# Patient Record
Sex: FEMALE | Race: WHITE | Employment: FULL TIME | URBAN - METROPOLITAN AREA
[De-identification: names, ages, dates, MRNs, and addresses within clinical notes are randomized per-mention and may not be internally consistent; named-entity substitution may affect disease eponyms.]

---

## 2017-01-27 ENCOUNTER — HOSPITAL ENCOUNTER (OUTPATIENT)
Dept: OCCUPATIONAL MEDICINE | Age: 52
Discharge: HOME | End: 2017-01-27

## 2017-01-27 DIAGNOSIS — T14.8XXA JOINT INJURY: ICD-10-CM

## 2017-02-13 ENCOUNTER — HOSPITAL ENCOUNTER (OUTPATIENT)
Dept: MAMMOGRAPHY | Age: 52
Discharge: HOME | End: 2017-02-13
Attending: NURSE PRACTITIONER
Payer: COMMERCIAL

## 2017-02-13 DIAGNOSIS — Z12.39 SCREENING FOR BREAST CANCER: ICD-10-CM

## 2017-02-13 PROCEDURE — 77067 SCR MAMMO BI INCL CAD: CPT

## 2017-06-08 ENCOUNTER — HOSPITAL ENCOUNTER (OUTPATIENT)
Dept: ULTRASOUND IMAGING | Age: 52
Discharge: HOME | End: 2017-06-08
Attending: NURSE PRACTITIONER
Payer: COMMERCIAL

## 2017-06-08 DIAGNOSIS — R10.30 LOWER ABDOMINAL PAIN: ICD-10-CM

## 2017-06-08 PROCEDURE — 76830 TRANSVAGINAL US NON-OB: CPT

## 2017-06-09 PROBLEM — R10.2 PELVIC PAIN IN FEMALE: Status: ACTIVE | Noted: 2017-06-09

## 2017-06-09 PROBLEM — N83.209 HEMORRHAGIC OVARIAN CYST: Status: ACTIVE | Noted: 2017-06-09

## 2017-06-14 ENCOUNTER — HOSPITAL ENCOUNTER (OUTPATIENT)
Dept: CT IMAGING | Age: 52
Discharge: HOME | End: 2017-06-14
Attending: NURSE PRACTITIONER
Payer: COMMERCIAL

## 2017-06-14 DIAGNOSIS — R10.30 LOWER ABDOMINAL PAIN: ICD-10-CM

## 2017-06-14 PROCEDURE — 74011636320 HC RX REV CODE- 636/320

## 2017-06-14 PROCEDURE — 74177 CT ABD & PELVIS W/CONTRAST: CPT

## 2017-06-14 PROCEDURE — 74011000258 HC RX REV CODE- 258

## 2017-06-14 RX ORDER — SODIUM CHLORIDE 0.9 % (FLUSH) 0.9 %
10 SYRINGE (ML) INJECTION
Status: COMPLETED | OUTPATIENT
Start: 2017-06-14 | End: 2017-06-14

## 2017-06-14 RX ADMIN — Medication 10 ML: at 15:26

## 2017-06-14 RX ADMIN — DIATRIZOATE MEGLUMINE AND DIATRIZOATE SODIUM 15 ML: 660; 100 LIQUID ORAL; RECTAL at 15:26

## 2017-06-14 RX ADMIN — IOPAMIDOL 100 ML: 755 INJECTION, SOLUTION INTRAVENOUS at 15:26

## 2017-06-14 RX ADMIN — SODIUM CHLORIDE 100 ML: 900 INJECTION, SOLUTION INTRAVENOUS at 15:26

## 2018-06-07 ENCOUNTER — HOSPITAL ENCOUNTER (OUTPATIENT)
Dept: MAMMOGRAPHY | Age: 53
Discharge: HOME | End: 2018-06-07
Attending: NURSE PRACTITIONER
Payer: COMMERCIAL

## 2018-06-07 DIAGNOSIS — Z12.39 BREAST CANCER SCREENING: ICD-10-CM

## 2018-06-07 PROCEDURE — 77067 SCR MAMMO BI INCL CAD: CPT

## 2019-07-25 ENCOUNTER — HOSPITAL ENCOUNTER (OUTPATIENT)
Dept: MAMMOGRAPHY | Age: 54
Discharge: HOME | End: 2019-07-25
Attending: NURSE PRACTITIONER
Payer: COMMERCIAL

## 2019-07-25 DIAGNOSIS — Z12.31 VISIT FOR SCREENING MAMMOGRAM: ICD-10-CM

## 2019-07-25 PROCEDURE — 77067 SCR MAMMO BI INCL CAD: CPT

## 2020-08-12 ENCOUNTER — HOSPITAL ENCOUNTER (EMERGENCY)
Age: 55
Discharge: HOME | End: 2020-08-12
Attending: EMERGENCY MEDICINE
Payer: COMMERCIAL

## 2020-08-12 ENCOUNTER — APPOINTMENT (OUTPATIENT)
Dept: GENERAL RADIOLOGY | Age: 55
End: 2020-08-12
Attending: EMERGENCY MEDICINE
Payer: COMMERCIAL

## 2020-08-12 VITALS
RESPIRATION RATE: 18 BRPM | OXYGEN SATURATION: 98 % | TEMPERATURE: 98.2 F | WEIGHT: 170 LBS | HEART RATE: 79 BPM | BODY MASS INDEX: 25.18 KG/M2 | HEIGHT: 69 IN | SYSTOLIC BLOOD PRESSURE: 135 MMHG | DIASTOLIC BLOOD PRESSURE: 76 MMHG

## 2020-08-12 DIAGNOSIS — R07.89 ATYPICAL CHEST PAIN: Primary | ICD-10-CM

## 2020-08-12 DIAGNOSIS — R49.0 HOARSENESS: ICD-10-CM

## 2020-08-12 DIAGNOSIS — R06.02 SOB (SHORTNESS OF BREATH): ICD-10-CM

## 2020-08-12 LAB
ALBUMIN SERPL-MCNC: 4.4 G/DL (ref 3.5–5)
ALBUMIN/GLOB SERPL: 1.3 {RATIO} (ref 1.2–3.5)
ALP SERPL-CCNC: 132 U/L (ref 50–136)
ALT SERPL-CCNC: 13 U/L (ref 12–65)
ANION GAP SERPL CALC-SCNC: 6 MMOL/L (ref 7–16)
AST SERPL-CCNC: 9 U/L (ref 15–37)
BACTERIA URNS QL MICRO: 0 /HPF
BASOPHILS # BLD: 0 K/UL (ref 0–0.2)
BASOPHILS NFR BLD: 1 % (ref 0–2)
BILIRUB SERPL-MCNC: 0.7 MG/DL (ref 0.2–1.1)
BUN SERPL-MCNC: 14 MG/DL (ref 6–23)
CALCIUM SERPL-MCNC: 9.6 MG/DL (ref 8.3–10.4)
CASTS URNS QL MICRO: NORMAL /LPF
CHLORIDE SERPL-SCNC: 107 MMOL/L (ref 98–107)
CO2 SERPL-SCNC: 28 MMOL/L (ref 21–32)
CREAT SERPL-MCNC: 0.87 MG/DL (ref 0.6–1)
DIFFERENTIAL METHOD BLD: ABNORMAL
EOSINOPHIL # BLD: 0 K/UL (ref 0–0.8)
EOSINOPHIL NFR BLD: 0 % (ref 0.5–7.8)
EPI CELLS #/AREA URNS HPF: NORMAL /HPF
ERYTHROCYTE [DISTWIDTH] IN BLOOD BY AUTOMATED COUNT: 12 % (ref 11.9–14.6)
GLOBULIN SER CALC-MCNC: 3.3 G/DL (ref 2.3–3.5)
GLUCOSE SERPL-MCNC: 110 MG/DL (ref 65–100)
HCT VFR BLD AUTO: 45.8 % (ref 35.8–46.3)
HGB BLD-MCNC: 15.5 G/DL (ref 11.7–15.4)
IMM GRANULOCYTES # BLD AUTO: 0 K/UL (ref 0–0.5)
IMM GRANULOCYTES NFR BLD AUTO: 0 % (ref 0–5)
LYMPHOCYTES # BLD: 1.3 K/UL (ref 0.5–4.6)
LYMPHOCYTES NFR BLD: 21 % (ref 13–44)
MCH RBC QN AUTO: 29.7 PG (ref 26.1–32.9)
MCHC RBC AUTO-ENTMCNC: 33.8 G/DL (ref 31.4–35)
MCV RBC AUTO: 87.7 FL (ref 79.6–97.8)
MONOCYTES # BLD: 0.3 K/UL (ref 0.1–1.3)
MONOCYTES NFR BLD: 4 % (ref 4–12)
NEUTS SEG # BLD: 4.6 K/UL (ref 1.7–8.2)
NEUTS SEG NFR BLD: 74 % (ref 43–78)
NRBC # BLD: 0 K/UL (ref 0–0.2)
PLATELET # BLD AUTO: 284 K/UL (ref 150–450)
PMV BLD AUTO: 9.7 FL (ref 9.4–12.3)
POTASSIUM SERPL-SCNC: 3.8 MMOL/L (ref 3.5–5.1)
PROT SERPL-MCNC: 7.7 G/DL (ref 6.3–8.2)
RBC # BLD AUTO: 5.22 M/UL (ref 4.05–5.2)
RBC #/AREA URNS HPF: 0 /HPF
SODIUM SERPL-SCNC: 141 MMOL/L (ref 136–145)
TROPONIN-HIGH SENSITIVITY: 5.5 PG/ML (ref 0–14)
WBC # BLD AUTO: 6.3 K/UL (ref 4.3–11.1)
WBC URNS QL MICRO: NORMAL /HPF

## 2020-08-12 PROCEDURE — 93005 ELECTROCARDIOGRAM TRACING: CPT | Performed by: EMERGENCY MEDICINE

## 2020-08-12 PROCEDURE — 84484 ASSAY OF TROPONIN QUANT: CPT

## 2020-08-12 PROCEDURE — 80053 COMPREHEN METABOLIC PANEL: CPT

## 2020-08-12 PROCEDURE — 81015 MICROSCOPIC EXAM OF URINE: CPT

## 2020-08-12 PROCEDURE — 85025 COMPLETE CBC W/AUTO DIFF WBC: CPT

## 2020-08-12 PROCEDURE — 99284 EMERGENCY DEPT VISIT MOD MDM: CPT

## 2020-08-12 PROCEDURE — 71045 X-RAY EXAM CHEST 1 VIEW: CPT

## 2020-08-12 PROCEDURE — 81003 URINALYSIS AUTO W/O SCOPE: CPT

## 2020-08-12 NOTE — ED PROVIDER NOTES
81-year-old female presents with chest pain onset this morning. It is left parasternal in the costochondral region. It is sharp in nature and fleeting in duration comes and goes for just a few seconds at a time but recurs very frequently. There is no radiation to neck, jaw, shoulder, arm. There is no nausea no diaphoresis. Patient has no cardiac history. Patient has no history of DVT or PE, no risk factors for DVT or PE. She has felt herself a little short winded today intermittently although not specifically with the chest pains. She describes it as a sensation as though she cannot take a full breath, and sometimes has to make herself try to take a deep breath. She feels a little more winded with household activities. Patient has noticed a raspiness to her voice for a couple days. Saturday evening August 15 patient was seated on the sofa around 6 or 7 PM and felt something close off her windpipe for a few seconds she was unable to breathe and then shortly thereafter was able to start taking a breath again. She is not sure if she had just had dinner beforehand or not but did not experience any water brash with this. The next day on Nathan, August 15 when she was laying down to go to bed she noticed a similar throat closing experience which was not as total, but lasted for perhaps 5 minutes she was able to breathe through it,    Patient seen by her PCP Monday, August 10 and a COVID test was returned as negative. She has a 8-month history of a feeling of \"a rock\" in her throat which does not cause difficulty swallowing but she just feels is there. She is been taking apple cider vinegar Gummies at the direction of her allergist in case this is being caused by \"low-level reflux\". This is been for 8 months and she was thinking of seeing a GI doctor. She has not been on any prolonged trials of PPIs or H2 blockers. She rarely finds herself needing to use an antacid.            Past Medical History: Diagnosis Date    Back pain     Hypertension     Menopause     Migraine        Past Surgical History:   Procedure Laterality Date    HX ANKLE FRACTURE TX      2 screws    HX OTHER SURGICAL      left fibula - with plate and 8 screws         Family History:   Problem Relation Age of Onset    Heart Disease Father 66    Breast Cancer Neg Hx        Social History     Socioeconomic History    Marital status:      Spouse name: Not on file    Number of children: Not on file    Years of education: Not on file    Highest education level: Not on file   Occupational History    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   Social Needs    Financial resource strain: Not on file    Food insecurity     Worry: Not on file     Inability: Not on file   [employer] needs     Medical: Not on file     Non-medical: Not on file   Tobacco Use    Smoking status: Never Smoker    Smokeless tobacco: Never Used   Substance and Sexual Activity    Alcohol use: No     Alcohol/week: 0.0 standard drinks    Drug use: No    Sexual activity: Yes     Partners: Male     Birth control/protection: Surgical   Lifestyle    Physical activity     Days per week: Not on file     Minutes per session: Not on file    Stress: Not on file   Relationships    Social connections     Talks on phone: Not on file     Gets together: Not on file     Attends [employer] service: Not on file     Active member of club or organization: Not on file     Attends meetings of clubs or organizations: Not on file     Relationship status: Not on file    Intimate partner violence     Fear of current or ex partner: Not on file     Emotionally abused: Not on file     Physically abused: Not on file     Forced sexual activity: Not on file   Other Topics Concern     Service Not Asked    Blood Transfusions Not Asked    Caffeine Concern No    Occupational Exposure Not Asked   Oleksandr Croft Hazards Not Asked    Sleep Concern Not Asked    Stress Concern Not Asked    Weight Concern Not Asked    Special Diet Not Asked    Back Care Not Asked    Exercise No    Bike Helmet Not Asked    Seat Belt Yes    Self-Exams Yes   Social History Narrative    Lives with  Maircruz Singh  since 1986. Children    Roberto Carlos Singer 7/26/93    Eloina Diaz 3/24/96        Abuse: Feels safe at home, no history of physical abuse, no history of sexual abuse         ALLERGIES: Lactase    Review of Systems   Constitutional: Negative for chills and fever. HENT: Positive for congestion and voice change. Negative for postnasal drip, rhinorrhea, sore throat and trouble swallowing. Eyes: Negative for discharge and redness. Respiratory: Positive for shortness of breath. Negative for cough. Cardiovascular: Positive for chest pain. Negative for palpitations and leg swelling. Gastrointestinal: Negative for abdominal pain, diarrhea, nausea and vomiting. Genitourinary: Negative for difficulty urinating and dysuria. Musculoskeletal: Negative for arthralgias, back pain, myalgias, neck pain and neck stiffness. Skin: Negative for rash and wound. Tick bite to her right shoulder in June estimates it was on it for no more than an hour, patient's had no fevers or joint aches since, no rash associated with this. Neurological: Negative for dizziness, speech difficulty, weakness, light-headedness and headaches. All other systems reviewed and are negative. Vitals:    08/12/20 1711   BP: 161/64   Pulse: 90   Resp: 18   Temp: 98.1 °F (36.7 °C)   SpO2: 99%   Weight: 77.1 kg (170 lb)   Height: 5' 9\" (1.753 m)            Physical Exam  Vitals signs and nursing note reviewed. Constitutional:       General: She is not in acute distress. Appearance: Normal appearance. She is well-developed. She is not ill-appearing, toxic-appearing or diaphoretic. HENT:      Head: Normocephalic and atraumatic. Eyes:      General: No scleral icterus. Right eye: No discharge. Left eye: No discharge. Conjunctiva/sclera: Conjunctivae normal.      Pupils: Pupils are equal, round, and reactive to light. Neck:      Musculoskeletal: Normal range of motion and neck supple. Cardiovascular:      Rate and Rhythm: Normal rate and regular rhythm. Heart sounds: Normal heart sounds. No murmur. No gallop. Pulmonary:      Effort: Pulmonary effort is normal. No respiratory distress. Breath sounds: Normal breath sounds. No wheezing or rales. Chest:      Chest wall: Tenderness present. No swelling or crepitus. Abdominal:      General: Bowel sounds are normal.      Palpations: Abdomen is soft. Tenderness: There is no abdominal tenderness. There is no guarding. Musculoskeletal: Normal range of motion. Skin:     General: Skin is warm and dry. Findings: No rash. Neurological:      General: No focal deficit present. Mental Status: She is alert and oriented to person, place, and time. Mental status is at baseline. Motor: No abnormal muscle tone. Comments: cni 2-12 grossly  Nl gait,  Nl speech     Psychiatric:         Mood and Affect: Mood normal.         Behavior: Behavior normal.          MDM  Number of Diagnoses or Management Options  Atypical chest pain:   Hoarseness:   SOB (shortness of breath):   Diagnosis management comments: Medical decision making note:  Atypical chest pain EKG shows no acute coronary syndrome, enzymes are negative, chest pain seems reproducible and is likely musculoskeletal.  Recommend pcp f/u, consider 8 week trial of ppi before GI f/u  This concludes the \"medical decision making note\" part of this emergency department visit note.            Procedures

## 2020-08-13 LAB
ATRIAL RATE: 77 BPM
CALCULATED P AXIS, ECG09: 94 DEGREES
CALCULATED R AXIS, ECG10: 86 DEGREES
CALCULATED T AXIS, ECG11: 80 DEGREES
DIAGNOSIS, 93000: NORMAL
P-R INTERVAL, ECG05: 154 MS
Q-T INTERVAL, ECG07: 356 MS
QRS DURATION, ECG06: 94 MS
QTC CALCULATION (BEZET), ECG08: 402 MS
VENTRICULAR RATE, ECG03: 77 BPM

## 2020-08-13 NOTE — ED NOTES
I have reviewed discharge instructions with the patient. The patient verbalized understanding. Patient left ED via Discharge Method: ambulatory to Home with self. Opportunity for questions and clarification provided. Patient given 0 scripts. To continue your aftercare when you leave the hospital, you may receive an automated call from our care team to check in on how you are doing. This is a free service and part of our promise to provide the best care and service to meet your aftercare needs.  If you have questions, or wish to unsubscribe from this service please call [phone]. Thank you for Choosing our [address] [place] [address] Emergency Department.

## 2020-08-13 NOTE — DISCHARGE INSTRUCTIONS
Take 3-4 advil every 8 hours for 2 days  Take a prilosec otc20, every day  Follow up with your doctor at [address] a finger tip pulse ox probe as discussed to gauge shortness of breath -   high 90's is good  low 90's concerning  Anything less than 90% is dangerous    Patient Education     Patient Education        Musculoskeletal Chest Pain: Care Instructions  Your Care Instructions     Chest pain is not always a sign that something is wrong with your heart or that you have another serious problem. The doctor thinks your chest pain is caused by strained muscles or ligaments, inflamed chest cartilage, or another problem in your chest, rather than by your heart. You may need more tests to find the cause of your chest pain. Follow-up care is a key part of your treatment and safety. Be sure to make and go to all appointments, and call your doctor if you are having problems. It's also a good idea to know your test results and keep a list of the medicines you take. How can you care for yourself at home? · Take pain medicines exactly as directed. ? If the doctor gave you a prescription medicine for pain, take it as prescribed. ? If you are not taking a prescription pain medicine, ask your doctor if you can take an over-the-counter medicine. · Rest and protect the sore area. · Stop, change, or take a break from any activity that may be causing your pain or soreness. · Put ice or a cold pack on the sore area for 10 to 20 minutes at a time. Try to do this every 1 to 2 hours for the next 3 days (when you are awake) or until the swelling goes down. Put a thin cloth between the ice and your skin. · After 2 or 3 days, apply a heating pad set on low or a warm cloth to the area that hurts. Some doctors suggest that you go back and forth between hot and cold. · Do not wrap or tape your ribs for support. This may cause you to take smaller breaths, which could increase your risk of lung problems.   · Mentholated creams such as Cara Bob or [facility] may soothe sore muscles. Follow the instructions on the package. · Follow your doctor's instructions for exercising. · Gentle stretching and massage may help you get better faster. Stretch slowly to the point just before pain begins, and hold the stretch for at least 15 to 30 seconds. Do this 3 or 4 times a day. Stretch just after you have applied heat. · As your pain gets better, slowly return to your normal activities. Any increased pain may be a sign that you need to rest a while longer. When should you call for help? [id number] anytime you think you may need emergency care. For example, call if:  · You have chest pain or pressure. This may occur with:  ? Sweating. ? Shortness of breath. ? Nausea or vomiting. ? Pain that spreads from the chest to the neck, jaw, or one or both shoulders or arms. ? Dizziness or lightheadedness. ? A fast or uneven pulse. After calling 911, chew 1 adult-strength aspirin. Wait for an ambulance. Do not try to drive yourself. · You have sudden chest pain and shortness of breath, or you cough up blood. Call your doctor now or seek immediate medical care if:  · You have any trouble breathing. · Your chest pain gets worse. · Your chest pain occurs consistently with exercise and is relieved by rest.  Watch closely for changes in your health, and be sure to contact your doctor if:  · Your chest pain does not get better after 1 week. Where can you learn more? Go to http://jenifer-darby.info/  Enter V293 in the search box to learn more about \"Musculoskeletal Chest Pain: Care Instructions. \"  Current as of: June 26, 2019               Content Version: 12.5  © 7199-4725 [employer]. Care instructions adapted under license by [employer] (which disclaims liability or warranty for this information).  If you have questions about a medical condition or this instruction, always ask your healthcare professional. Atif Pate, [facility] disclaims any warranty or liability for your use of this information. Patient Education        Shortness of Breath: Care Instructions  Your Care Instructions  Shortness of breath has many causes. Sometimes conditions such as anxiety can lead to shortness of breath. Some people get mild shortness of breath when they exercise. Trouble breathing also can be a symptom of a serious problem, such as asthma, lung disease, emphysema, heart problems, and pneumonia. If your shortness of breath continues, you may need tests and treatment. Watch for any changes in your breathing and other symptoms. Follow-up care is a key part of your treatment and safety. Be sure to make and go to all appointments, and call your doctor if you are having problems. It's also a good idea to know your test results and keep a list of the medicines you take. How can you care for yourself at home? · Do not smoke or allow others to smoke around you. If you need help quitting, talk to your doctor about stop-smoking programs and medicines. These can increase your chances of quitting for good. · Get plenty of rest and sleep. · Take your medicines exactly as prescribed. Call your doctor if you think you are having a problem with your medicine. · Find healthy ways to deal with stress. ? Exercise daily. ? Get plenty of sleep. ? Eat regularly and well. When should you call for help? [id number] anytime you think you may need emergency care. For example, call if:  · You have severe shortness of breath. · You have symptoms of a heart attack. These may include:  ? Chest pain or pressure, or a strange feeling in the chest.  ? Sweating. ? Shortness of breath. ? Nausea or vomiting. ? Pain, pressure, or a strange feeling in the back, neck, jaw, or upper belly or in one or both shoulders or arms. ? Lightheadedness or sudden weakness. ? A fast or irregular heartbeat.   After you call 911, the  may tell you to chew 1 adult-strength or 2 to 4 low-dose aspirin. Wait for an ambulance. Do not try to drive yourself. Call your doctor now or seek immediate medical care if:  · Your shortness of breath gets worse or you start to wheeze. Wheezing is a high-pitched sound when you breathe. · You wake up at night out of breath or have to prop your head up on several pillows to breathe. · You are short of breath after only light activity or while at rest.  Watch closely for changes in your health, and be sure to contact your doctor if:  · You do not get better over the next 1 to 2 days. Where can you learn more? Go to http://jenifer-darby.info/  Enter S780 in the search box to learn more about \"Shortness of Breath: Care Instructions. \"  Current as of: February 24, 2020               Content Version: 12.5  © 1170-5495 [email or web address]. Care instructions adapted under license by [employer] (which disclaims liability or warranty for this information). If you have questions about a medical condition or this instruction, always ask your healthcare professional. [address] any warranty or liability for your use of this information. Costochondritis: Care Instructions  Your Care Instructions  You have chest pain because the cartilage of your rib cage is inflamed. This problem is called costochondritis. This type of chest wall pain may last from days to weeks. It is not a heart problem. Sometimes costochondritis occurs with a cold or the flu, and other times the exact cause is not known. Follow-up care is a key part of your treatment and safety. Be sure to make and go to all appointments, and call your doctor if you are having problems. It's also a good idea to know your test results and keep a list of the medicines you take. How can you care for yourself at home? · Take medicines for pain and inflammation exactly as directed.   ? If the doctor gave you a prescription medicine, take it as prescribed. ? If you are not taking a prescription pain medicine, ask your doctor if you can take an over-the-counter medicine. ? Do not take two or more pain medicines at the same time unless the doctor told you to. Many pain medicines have acetaminophen, which is Tylenol. Too much acetaminophen (Tylenol) can be harmful. · It may help to use a warm compress or heating pad (set on low) on your chest. You can also try alternating heat and ice. Put ice or a cold pack on the area for 10 to 20 minutes at a time. Put a thin cloth between the ice and your skin. · Avoid any activity that strains the chest area. As your pain gets better, you can slowly return to your normal activities. · Do not use tape, an elastic bandage, a \"rib belt,\" or anything else that restricts your chest wall motion. When should you call for help? [id number] anytime you think you may need emergency care. For example, call if:  · You have new or different chest pain or pressure. This may occur with:  ? Sweating. ? Shortness of breath. ? Nausea or vomiting. ? Pain that spreads from the chest to the neck, jaw, or one or both shoulders or arms. ? Dizziness or lightheadedness. ? A fast or uneven pulse. After calling 911, chew 1 adult-strength aspirin. Wait for an ambulance. Do not try to drive yourself. · You have severe trouble breathing. Call your doctor now or seek immediate medical care if:  · You have a fever or cough. · You have any trouble breathing. · Your chest pain gets worse. Watch closely for changes in your health, and be sure to contact your doctor if:  · Your chest pain continues even though you are taking anti-inflammatory medicine. · Your chest wall pain has not improved after 5 to 7 days. Where can you learn more? Go to http://jenifer-darby.info/  Enter [id number] in the search box to learn more about \"Costochondritis: Care Instructions. \"  Current as of: June 26, 9679               LLBGOWX Version: 12.5  © 2386-3234 [employer]. Care instructions adapted under license by [employer] (which disclaims liability or warranty for this information). If you have questions about a medical condition or this instruction, always ask your healthcare professional. [address] any warranty or liability for your use of this information.

## 2021-01-29 ENCOUNTER — APPOINTMENT (RX ONLY)
Dept: URBAN - METROPOLITAN AREA CLINIC 349 | Facility: CLINIC | Age: 56
Setting detail: DERMATOLOGY
End: 2021-01-29

## 2021-01-29 DIAGNOSIS — L72.8 OTHER FOLLICULAR CYSTS OF THE SKIN AND SUBCUTANEOUS TISSUE: ICD-10-CM

## 2021-01-29 DIAGNOSIS — Z12.83 ENCOUNTER FOR SCREENING FOR MALIGNANT NEOPLASM OF SKIN: ICD-10-CM

## 2021-01-29 DIAGNOSIS — D485 NEOPLASM OF UNCERTAIN BEHAVIOR OF SKIN: ICD-10-CM

## 2021-01-29 DIAGNOSIS — D22 MELANOCYTIC NEVI: ICD-10-CM

## 2021-01-29 DIAGNOSIS — L85.3 XEROSIS CUTIS: ICD-10-CM

## 2021-01-29 DIAGNOSIS — D18.0 HEMANGIOMA: ICD-10-CM

## 2021-01-29 PROBLEM — D48.5 NEOPLASM OF UNCERTAIN BEHAVIOR OF SKIN: Status: ACTIVE | Noted: 2021-01-29

## 2021-01-29 PROBLEM — D18.01 HEMANGIOMA OF SKIN AND SUBCUTANEOUS TISSUE: Status: ACTIVE | Noted: 2021-01-29

## 2021-01-29 PROBLEM — D22.9 MELANOCYTIC NEVI, UNSPECIFIED: Status: ACTIVE | Noted: 2021-01-29

## 2021-01-29 PROCEDURE — 88304 TISSUE EXAM BY PATHOLOGIST: CPT

## 2021-01-29 PROCEDURE — ? COUNSELING

## 2021-01-29 PROCEDURE — 11102 TANGNTL BX SKIN SINGLE LES: CPT

## 2021-01-29 PROCEDURE — ? PATHOLOGY BILLING

## 2021-01-29 PROCEDURE — ? BIOPSY BY SHAVE METHOD

## 2021-01-29 PROCEDURE — 99242 OFF/OP CONSLTJ NEW/EST SF 20: CPT | Mod: 25

## 2021-01-29 PROCEDURE — A4550 SURGICAL TRAYS: HCPCS

## 2021-01-29 ASSESSMENT — LOCATION SIMPLE DESCRIPTION DERM
LOCATION SIMPLE: LEFT CHEEK
LOCATION SIMPLE: LEFT CHEEK
LOCATION SIMPLE: LEFT ANTERIOR NECK
LOCATION SIMPLE: ABDOMEN

## 2021-01-29 ASSESSMENT — LOCATION ZONE DERM
LOCATION ZONE: NECK
LOCATION ZONE: TRUNK
LOCATION ZONE: FACE
LOCATION ZONE: FACE

## 2021-01-29 ASSESSMENT — LOCATION DETAILED DESCRIPTION DERM
LOCATION DETAILED: LEFT LATERAL ABDOMEN
LOCATION DETAILED: LEFT INFERIOR LATERAL NECK
LOCATION DETAILED: LEFT CENTRAL MALAR CHEEK
LOCATION DETAILED: LEFT CENTRAL MALAR CHEEK

## 2021-01-29 NOTE — HPI: SKIN LESION
How Severe Is Your Skin Lesion?: moderate
Is This A New Presentation, Or A Follow-Up?: Skin Lesion
Which Family Member (Optional)?: Grandmother

## 2021-01-29 NOTE — PROCEDURE: BIOPSY BY SHAVE METHOD
Hide Second Anesthesia?: No
Electrodesiccation Text: The wound bed was treated with electrodesiccation after the biopsy was performed.
Depth Of Biopsy: dermis
Biopsy Type: H and E
Size Of Lesion In Cm: 0
Billing Type: Third-Party Bill
Anesthesia Type: 2% lidocaine with epinephrine
Type Of Destruction Used: Curettage
Notification Instructions: Patient will be notified of biopsy results. However, patient instructed to call the office if not contacted within 2 weeks.
Silver Nitrate Text: The wound bed was treated with silver nitrate after the biopsy was performed.
Accession #: dr [email or web address] read
Hemostasis: Aluminum Chloride
Anesthesia Volume In Cc (Will Not Render If 0): 0.5
Cryotherapy Text: The wound bed was treated with cryotherapy after the biopsy was performed.
Dressing: bandage
Consent: Written consent was obtained and risks were reviewed including but not limited to scarring, infection, bleeding, scabbing, incomplete removal, nerve damage and allergy to anesthesia.
Detail Level: Detailed
Was A Bandage Applied: Yes
Wound Care: Vaseline
Biopsy Method: Dermablade
Post-Care Instructions: I reviewed with the patient in detail post-care instructions. Patient is to keep the biopsy site dry overnight, and then apply bacitracin twice daily until healed. Patient may apply hydrogen peroxide soaks to remove any crusting.  After the procedure, the patient was observed for 5-10 minutes and was oriented to person, place and time and demied feeling dizzy, queasy, and stated that they did not feel that they were going to faint.
Electrodesiccation And Curettage Text: The wound bed was treated with electrodesiccation and curettage after the biopsy was performed.
Information: Selecting Yes will display possible errors in your note based on the variables you have selected. This validation is only offered as a suggestion for you. PLEASE NOTE THAT THE VALIDATION TEXT WILL BE REMOVED WHEN YOU FINALIZE YOUR NOTE. IF YOU WANT TO FAX A PRELIMINARY NOTE YOU WILL NEED TO TOGGLE THIS TO 'NO' IF YOU DO NOT WANT IT IN YOUR FAXED NOTE.

## 2021-01-29 NOTE — PROCEDURE: PATHOLOGY BILLING
Immunohistochemistry ([id number] and [id number]) billing is not performed here. Please use the Immunohistochemistry Stain Billing plan to accomplish this. Immunohistochemistry (51177 and 16499) billing is not performed here. Please use the Immunohistochemistry Stain Billing plan to accomplish this.

## 2021-07-29 ENCOUNTER — HOSPITAL ENCOUNTER (OUTPATIENT)
Dept: LAB | Age: 56
Discharge: HOME | End: 2021-07-29

## 2021-07-29 PROCEDURE — 88305 TISSUE EXAM BY PATHOLOGIST: CPT

## 2022-03-20 PROBLEM — R10.2 PELVIC PAIN IN FEMALE: Status: ACTIVE | Noted: 2017-06-09

## 2022-03-20 PROBLEM — N83.209 HEMORRHAGIC OVARIAN CYST: Status: ACTIVE | Noted: 2017-06-09

## 2023-03-27 ENCOUNTER — HOSPITAL ENCOUNTER (OUTPATIENT)
Dept: MAMMOGRAPHY | Age: 58
Discharge: HOME | End: 2023-03-30
Payer: COMMERCIAL

## 2023-03-27 DIAGNOSIS — Z12.31 VISIT FOR SCREENING MAMMOGRAM: ICD-10-CM

## 2023-03-27 PROCEDURE — 77063 BREAST TOMOSYNTHESIS BI: CPT

## 2024-02-28 ENCOUNTER — TRANSCRIBE ORDERS (OUTPATIENT)
Dept: SCHEDULING | Age: 59
End: 2024-02-28

## 2024-02-28 DIAGNOSIS — Z12.31 VISIT FOR SCREENING MAMMOGRAM: Primary | ICD-10-CM

## 2024-03-28 ENCOUNTER — HOSPITAL ENCOUNTER (OUTPATIENT)
Dept: MAMMOGRAPHY | Age: 59
Discharge: HOME | End: 2024-03-28
Payer: COMMERCIAL

## 2024-03-28 VITALS — WEIGHT: 173 LBS | HEIGHT: 69 IN | BODY MASS INDEX: 25.62 KG/M2

## 2024-03-28 DIAGNOSIS — Z12.31 VISIT FOR SCREENING MAMMOGRAM: ICD-10-CM

## 2024-03-28 PROCEDURE — 77063 BREAST TOMOSYNTHESIS BI: CPT

## 2025-02-28 ENCOUNTER — TRANSCRIBE ORDERS (OUTPATIENT)
Dept: SCHEDULING | Age: 60
End: 2025-02-28

## 2025-02-28 DIAGNOSIS — Z12.31 OTHER SCREENING MAMMOGRAM: Primary | ICD-10-CM

## 2025-04-02 ENCOUNTER — HOSPITAL ENCOUNTER (OUTPATIENT)
Dept: MAMMOGRAPHY | Age: 60
Discharge: HOME | End: 2025-04-05
Payer: COMMERCIAL

## 2025-04-02 DIAGNOSIS — Z12.31 OTHER SCREENING MAMMOGRAM: ICD-10-CM

## 2025-04-02 PROCEDURE — 77063 BREAST TOMOSYNTHESIS BI: CPT

## 2025-06-23 ENCOUNTER — HOSPITAL ENCOUNTER (EMERGENCY)
Age: 60
Discharge: HOME | End: 2025-06-23
Attending: EMERGENCY MEDICINE
Payer: COMMERCIAL

## 2025-06-23 VITALS
BODY MASS INDEX: 22.22 KG/M2 | HEART RATE: 88 BPM | RESPIRATION RATE: 16 BRPM | TEMPERATURE: 98.4 F | OXYGEN SATURATION: 99 % | HEIGHT: 69 IN | WEIGHT: 150 LBS | DIASTOLIC BLOOD PRESSURE: 91 MMHG | SYSTOLIC BLOOD PRESSURE: 134 MMHG

## 2025-06-23 DIAGNOSIS — I47.19 ATRIAL TACHYCARDIA: Primary | ICD-10-CM

## 2025-06-23 LAB
ALBUMIN SERPL-MCNC: 3.5 G/DL (ref 3.5–5)
ALBUMIN/GLOB SERPL: 1.5 (ref 1–1.9)
ALP SERPL-CCNC: 110 U/L (ref 35–104)
ALT SERPL-CCNC: 10 U/L (ref 8–45)
ANION GAP SERPL CALC-SCNC: 12 MMOL/L (ref 7–16)
AST SERPL-CCNC: 18 U/L (ref 15–37)
BASOPHILS # BLD: 0.03 K/UL (ref 0–0.2)
BASOPHILS NFR BLD: 0.4 % (ref 0–2)
BILIRUB SERPL-MCNC: 0.3 MG/DL (ref 0–1.2)
BUN SERPL-MCNC: 14 MG/DL (ref 6–23)
CALCIUM SERPL-MCNC: 8.2 MG/DL (ref 8.8–10.2)
CHLORIDE SERPL-SCNC: 114 MMOL/L (ref 98–107)
CO2 SERPL-SCNC: 20 MMOL/L (ref 20–29)
CREAT SERPL-MCNC: 0.88 MG/DL (ref 0.6–1.1)
D DIMER PPP FEU-MCNC: <0.27 UG/ML(FEU)
DIFFERENTIAL METHOD BLD: NORMAL
EOSINOPHIL # BLD: 0.05 K/UL (ref 0–0.8)
EOSINOPHIL NFR BLD: 0.7 % (ref 0.5–7.8)
ERYTHROCYTE [DISTWIDTH] IN BLOOD BY AUTOMATED COUNT: 13 % (ref 11.9–14.6)
GLOBULIN SER CALC-MCNC: 2.4 G/DL (ref 2.3–3.5)
GLUCOSE SERPL-MCNC: 98 MG/DL (ref 70–99)
HCT VFR BLD AUTO: 44.6 % (ref 35.8–46.3)
HGB BLD-MCNC: 14.7 G/DL (ref 11.7–15.4)
IMM GRANULOCYTES # BLD AUTO: 0.02 K/UL (ref 0–0.5)
IMM GRANULOCYTES NFR BLD AUTO: 0.3 % (ref 0–5)
LYMPHOCYTES # BLD: 1.79 K/UL (ref 0.5–4.6)
LYMPHOCYTES NFR BLD: 24.5 % (ref 13–44)
MAGNESIUM SERPL-MCNC: 1.9 MG/DL (ref 1.8–2.4)
MCH RBC QN AUTO: 29.1 PG (ref 26.1–32.9)
MCHC RBC AUTO-ENTMCNC: 33 G/DL (ref 31.4–35)
MCV RBC AUTO: 88.3 FL (ref 82–102)
MONOCYTES # BLD: 0.38 K/UL (ref 0.1–1.3)
MONOCYTES NFR BLD: 5.2 % (ref 4–12)
NEUTS SEG # BLD: 5.04 K/UL (ref 1.7–8.2)
NEUTS SEG NFR BLD: 68.9 % (ref 43–78)
NRBC # BLD: 0 K/UL (ref 0–0.2)
PLATELET # BLD AUTO: 256 K/UL (ref 150–450)
PMV BLD AUTO: 9.6 FL (ref 9.4–12.3)
POTASSIUM SERPL-SCNC: 3.6 MMOL/L (ref 3.5–5.1)
PROT SERPL-MCNC: 5.9 G/DL (ref 6.3–8.2)
RBC # BLD AUTO: 5.05 M/UL (ref 4.05–5.2)
SODIUM SERPL-SCNC: 146 MMOL/L (ref 136–145)
T4 FREE SERPL-MCNC: 1.2 NG/DL (ref 0.9–1.7)
TSH W FREE THYROID IF ABNORMAL: 5.29 UIU/ML (ref 0.27–4.2)
WBC # BLD AUTO: 7.3 K/UL (ref 4.3–11.1)

## 2025-06-23 PROCEDURE — 96374 THER/PROPH/DIAG INJ IV PUSH: CPT

## 2025-06-23 PROCEDURE — 84439 ASSAY OF FREE THYROXINE: CPT

## 2025-06-23 PROCEDURE — 85025 COMPLETE CBC W/AUTO DIFF WBC: CPT

## 2025-06-23 PROCEDURE — 2500000003 HC RX 250 WO HCPCS

## 2025-06-23 PROCEDURE — 84443 ASSAY THYROID STIM HORMONE: CPT

## 2025-06-23 PROCEDURE — 93005 ELECTROCARDIOGRAM TRACING: CPT | Performed by: EMERGENCY MEDICINE

## 2025-06-23 PROCEDURE — 85379 FIBRIN DEGRADATION QUANT: CPT

## 2025-06-23 PROCEDURE — 80053 COMPREHEN METABOLIC PANEL: CPT

## 2025-06-23 PROCEDURE — 83735 ASSAY OF MAGNESIUM: CPT

## 2025-06-23 PROCEDURE — 99284 EMERGENCY DEPT VISIT MOD MDM: CPT

## 2025-06-23 RX ORDER — DILTIAZEM HYDROCHLORIDE 5 MG/ML
INJECTION INTRAVENOUS
Status: COMPLETED
Start: 2025-06-23 | End: 2025-06-23

## 2025-06-23 RX ORDER — DILTIAZEM HYDROCHLORIDE 5 MG/ML
10 INJECTION INTRAVENOUS ONCE
Status: COMPLETED | OUTPATIENT
Start: 2025-06-23 | End: 2025-06-23

## 2025-06-23 RX ORDER — METOPROLOL SUCCINATE 50 MG/1
50 TABLET, EXTENDED RELEASE ORAL DAILY
Qty: 30 TABLET | Refills: 0 | Status: SHIPPED | OUTPATIENT
Start: 2025-06-23

## 2025-06-23 RX ADMIN — DILTIAZEM HYDROCHLORIDE 10 MG: 5 INJECTION, SOLUTION INTRAVENOUS at 15:15

## 2025-06-23 RX ADMIN — DILTIAZEM HYDROCHLORIDE 10 MG: 5 INJECTION INTRAVENOUS at 15:15

## 2025-06-23 ASSESSMENT — LIFESTYLE VARIABLES
HOW OFTEN DO YOU HAVE A DRINK CONTAINING ALCOHOL: NEVER
HOW MANY STANDARD DRINKS CONTAINING ALCOHOL DO YOU HAVE ON A TYPICAL DAY: PATIENT DOES NOT DRINK

## 2025-06-23 ASSESSMENT — PAIN - FUNCTIONAL ASSESSMENT: PAIN_FUNCTIONAL_ASSESSMENT: NONE - DENIES PAIN

## 2025-06-23 NOTE — ED TRIAGE NOTES
Pt arrives from home w c/o palpitations when she was cleaning her windows. Pt describes it as \"doing the washer machine\" PT was in SVT at 150 w ems, per ems, PT has a 18 g LAC and received 1 L of NS, PT denies cardiac hx.

## 2025-06-23 NOTE — ED PROVIDER NOTES
Emergency Department Provider Note       [facility] EMERGENCY DEPT   PCP: Unknown, Provider   Age: 59 y.o.   Sex: female     DISPOSITION Decision To Discharge 06/23/2025 05:32:25 PM    ICD-10-CM    1. Atrial tachycardia  I47.19           Medical Decision Making       ED Course as of 06/24/25 1042   Mon Jun 23, 2025   1528 I got emergently called to another room while the nurse gave the patient Cardizem.  She reports that patient's heart rate slowed down immediately after the medication.  Subsequent EKG shows normal sinus rhythm. [DZ]      ED Course User Index  [DZ] Selin Hernadez MD   Patient seen in a sinus rhythm with rate controlled.  Her workup is negative and PE and thyroid causes are ruled out.  Discussed with Dr. Grossman.  He recommends stopping her verapamil and starting Toprol-XL.  Discussed this with the patient.  Dr. Grossman was able to place the referral for Holter and to follow-up.  Patient happy with plan.  1 or more acute illnesses that pose a threat to life or bodily function.   Prescription drug management performed.  Drug therapy given requiring intensive monitoring for toxicity.  Discussion with external consultants.  Shared medical decision making was utilized in creating the patients health plan today.  I independently ordered and reviewed each unique test.    I reviewed external records: provider visit note from outside specialist.     ED cardiac monitoring rhythm strip was ordered and interpreted:  sinus rhythm, no evidence of an arrhythmia  ST Segments:Normal ST segments - NO STEMI   Rate: 93    ED provider's independent EKG interpretation regular tachycardia rate 153 normal QRS a flutter versus SVT  The management of this patient was discussed with an external consultant.            History     Presents with complaint of palpitations while she was cleaning a window.  Patient states she was inside.  She felt her heart rate go up significantly.  She feels like it is shaking her body and

## 2025-06-24 LAB
EKG ATRIAL RATE: 124 BPM
EKG DIAGNOSIS: NORMAL
EKG P AXIS: 101 DEGREES
EKG P-R INTERVAL: 89 MS
EKG Q-T INTERVAL: 304 MS
EKG QRS DURATION: 92 MS
EKG QTC CALCULATION (BAZETT): 468 MS
EKG R AXIS: -70 DEGREES
EKG T AXIS: 55 DEGREES
EKG VENTRICULAR RATE: 142 BPM

## 2025-06-24 PROCEDURE — 93010 ELECTROCARDIOGRAM REPORT: CPT | Performed by: INTERNAL MEDICINE

## 2025-06-27 ENCOUNTER — TELEPHONE (OUTPATIENT)
Age: 60
End: 2025-06-27

## 2025-06-30 RX ORDER — ASCORBIC ACID 500 MG
500 TABLET ORAL DAILY
COMMUNITY

## 2025-06-30 RX ORDER — FAMOTIDINE 20 MG/1
20 TABLET, FILM COATED ORAL 2 TIMES DAILY
COMMUNITY

## 2025-08-08 ENCOUNTER — TELEPHONE (OUTPATIENT)
Age: 60
End: 2025-08-08

## 2025-08-08 ENCOUNTER — INITIAL CONSULT (OUTPATIENT)
Age: 60
End: 2025-08-08

## 2025-08-08 VITALS
BODY MASS INDEX: 21.48 KG/M2 | DIASTOLIC BLOOD PRESSURE: 90 MMHG | HEART RATE: 71 BPM | SYSTOLIC BLOOD PRESSURE: 146 MMHG | WEIGHT: 145 LBS | HEIGHT: 69 IN

## 2025-08-08 DIAGNOSIS — I47.19 ATRIAL TACHYCARDIA: Primary | ICD-10-CM

## 2025-08-08 DIAGNOSIS — R06.02 SHORTNESS OF BREATH: ICD-10-CM

## 2025-08-08 RX ORDER — METOPROLOL TARTRATE 25 MG/1
25 TABLET, FILM COATED ORAL PRN
Qty: 30 TABLET | Refills: 0 | Status: SHIPPED | OUTPATIENT
Start: 2025-08-08

## 2025-08-11 ENCOUNTER — TELEPHONE (OUTPATIENT)
Age: 60
End: 2025-08-11

## 2025-08-13 ENCOUNTER — TELEPHONE (OUTPATIENT)
Age: 60
End: 2025-08-13

## 2025-08-15 RX ORDER — METOPROLOL TARTRATE 25 MG/1
25 TABLET, FILM COATED ORAL DAILY
Qty: 90 TABLET | Refills: 3 | Status: CANCELLED | OUTPATIENT
Start: 2025-08-15

## 2025-08-15 RX ORDER — METOPROLOL TARTRATE 25 MG/1
25 TABLET, FILM COATED ORAL DAILY
Qty: 90 TABLET | Refills: 3 | Status: SHIPPED | OUTPATIENT
Start: 2025-08-15

## 2025-08-16 RX ORDER — METOPROLOL SUCCINATE 25 MG/1
25 TABLET, EXTENDED RELEASE ORAL DAILY
Qty: 90 TABLET | Refills: 3 | Status: SHIPPED | OUTPATIENT
Start: 2025-08-16